# Patient Record
Sex: MALE | URBAN - NONMETROPOLITAN AREA
[De-identification: names, ages, dates, MRNs, and addresses within clinical notes are randomized per-mention and may not be internally consistent; named-entity substitution may affect disease eponyms.]

---

## 2021-03-15 ENCOUNTER — OFFICE VISIT (OUTPATIENT)
Dept: URBAN - NONMETROPOLITAN AREA CLINIC 13 | Facility: CLINIC | Age: 26
End: 2021-03-15
Payer: COMMERCIAL

## 2021-03-15 ENCOUNTER — DASHBOARD ENCOUNTERS (OUTPATIENT)
Age: 26
End: 2021-03-15

## 2021-03-15 VITALS
WEIGHT: 191 LBS | DIASTOLIC BLOOD PRESSURE: 80 MMHG | HEART RATE: 85 BPM | SYSTOLIC BLOOD PRESSURE: 133 MMHG | BODY MASS INDEX: 26.74 KG/M2 | HEIGHT: 71 IN

## 2021-03-15 DIAGNOSIS — R10.30 LOWER ABDOMINAL PAIN: ICD-10-CM

## 2021-03-15 DIAGNOSIS — R19.5 LOOSE STOOLS: ICD-10-CM

## 2021-03-15 DIAGNOSIS — R93.3 ABNORMAL CT SCAN, SIGMOID COLON: ICD-10-CM

## 2021-03-15 PROCEDURE — 99204 OFFICE O/P NEW MOD 45 MIN: CPT | Performed by: INTERNAL MEDICINE

## 2021-03-15 NOTE — HPI-TODAY'S VISIT:
Mr. Luigi Bullock is a 24 y/o M referred to GI clinic by Juni Delaney PA-C from [facility] ER for abdominal pain, diarrhea, and fevers.  CBC and CMP were normal.  He was seen there 2/22/21.  He had a CT scan showing concern for colitis with thickening in the sigmoid colon.  He was sent home on Flagyl and omnicef.  Today he reports that he has recovered.  He finished his antibiotics without issue and his stools are back to normal.  No further abdominal pain.  He is otherwise healthy.  His ER records were reviewed.